# Patient Record
Sex: FEMALE | Race: WHITE | Employment: OTHER | URBAN - METROPOLITAN AREA
[De-identification: names, ages, dates, MRNs, and addresses within clinical notes are randomized per-mention and may not be internally consistent; named-entity substitution may affect disease eponyms.]

---

## 2022-10-17 ENCOUNTER — OFFICE VISIT (OUTPATIENT)
Dept: URGENT CARE | Facility: CLINIC | Age: 67
End: 2022-10-17
Payer: MEDICARE

## 2022-10-17 VITALS
HEIGHT: 60 IN | WEIGHT: 110.25 LBS | RESPIRATION RATE: 16 BRPM | BODY MASS INDEX: 21.65 KG/M2 | OXYGEN SATURATION: 99 % | TEMPERATURE: 98 F | DIASTOLIC BLOOD PRESSURE: 81 MMHG | SYSTOLIC BLOOD PRESSURE: 174 MMHG | HEART RATE: 65 BPM

## 2022-10-17 DIAGNOSIS — F17.200 SMOKER: ICD-10-CM

## 2022-10-17 DIAGNOSIS — R63.4 WEIGHT LOSS: ICD-10-CM

## 2022-10-17 DIAGNOSIS — I10 HYPERTENSION, UNSPECIFIED TYPE: ICD-10-CM

## 2022-10-17 DIAGNOSIS — R20.2 PARESTHESIA: Primary | ICD-10-CM

## 2022-10-17 PROCEDURE — 93010 ELECTROCARDIOGRAM REPORT: CPT | Mod: S$PBB,,, | Performed by: INTERNAL MEDICINE

## 2022-10-17 PROCEDURE — 99204 OFFICE O/P NEW MOD 45 MIN: CPT | Mod: S$GLB,,, | Performed by: NURSE PRACTITIONER

## 2022-10-17 PROCEDURE — 99204 PR OFFICE/OUTPT VISIT, NEW, LEVL IV, 45-59 MIN: ICD-10-PCS | Mod: S$GLB,,, | Performed by: NURSE PRACTITIONER

## 2022-10-17 PROCEDURE — 93005 EKG 12-LEAD: ICD-10-PCS | Mod: S$GLB,,, | Performed by: NURSE PRACTITIONER

## 2022-10-17 PROCEDURE — 93005 ELECTROCARDIOGRAM TRACING: CPT | Mod: S$GLB,,, | Performed by: NURSE PRACTITIONER

## 2022-10-17 PROCEDURE — 93010 EKG 12-LEAD: ICD-10-PCS | Mod: S$PBB,,, | Performed by: INTERNAL MEDICINE

## 2022-10-17 RX ORDER — IBUPROFEN 600 MG/1
600 TABLET ORAL EVERY 6 HOURS PRN
COMMUNITY
Start: 2022-08-15

## 2022-10-17 RX ORDER — BACITRACIN 500 [USP'U]/G
OINTMENT TOPICAL 2 TIMES DAILY
COMMUNITY
Start: 2022-08-15

## 2022-10-17 NOTE — PROGRESS NOTES
Subjective:       Patient ID: Dania Yin is a 67 y.o. female.    Vitals:  height is 5' (1.524 m) and weight is 50 kg (110 lb 3.7 oz). Her tympanic temperature is 97.9 °F (36.6 °C). Her blood pressure is 174/81 (abnormal) and her pulse is 65. Her respiration is 16 and oxygen saturation is 99%.     Chief Complaint: Numbness (Both shoulder to finger tips)    66 y/o female presents to  today with c/o numbness to both arms, from shoulders down to fingertips x3 weeks. Pt reports she is a smoker. Denies alcohol use. Denies back injury, muscle strain, or nerve compression. Denies h/o numbness and tingling. Denies pain to neck, jaw, or arms.  Patient reports not sleeping well, weight loss, chills, nausea, and poor appetite. Patient reports that her  passed away in January, so she has had increased stress lately.  No h/o htn that she knows of, however has not seen a primary care provider in probably the last 5 years. Denies ataxia. Pt has no facial drooping or speech abnormality. Regarding weight loss, pt states she was close to 130 pounds five to six months ago, and now 110 pounds. Denies stomach pain. Denies constipation. Has some diarrhea, which has contributed to her decrease in appetite.       Constitution: Positive for appetite change and unexpected weight change.   Gastrointestinal:  Positive for nausea and diarrhea. Negative for vomiting and constipation.   Neurological:  Positive for numbness.     Objective:      Physical Exam   Constitutional: She is oriented to person, place, and time.  Non-toxic appearance. She does not appear ill. No distress.      Comments:Pt appears stable. Good activity level. No apparent distress. Appears well hydrated. Pink in color. Well-hydrated in appearance.      HENT:   Head: Normocephalic.   Ears:   Right Ear: Tympanic membrane, external ear and ear canal normal.   Left Ear: Tympanic membrane, external ear and ear canal normal.   Nose: Nose normal.   Mouth/Throat:  Mucous membranes are moist. Oropharynx is clear.   Eyes: Right eye exhibits no discharge. Left eye exhibits no discharge.   Neck: Neck supple. Carotid bruit is not present. No neck rigidity present.   Cardiovascular: Normal rate, regular rhythm and normal heart sounds.   Pulmonary/Chest: Effort normal and breath sounds normal.   Abdominal: Normal appearance and bowel sounds are normal. She exhibits no distension. Soft. flat abdomen There is no abdominal tenderness. There is no guarding.   Musculoskeletal: Normal range of motion.         General: Normal range of motion.      Cervical back: She exhibits no tenderness.   Lymphadenopathy:     She has no cervical adenopathy.   Neurological: She is alert and oriented to person, place, and time.   Skin: Skin is warm, dry and not diaphoretic. Capillary refill takes less than 2 seconds.   Psychiatric: Her behavior is normal. Mood normal.   Nursing note and vitals reviewed.      EKG: NSR    Assessment:       1. Paresthesia    2. Hypertension, unspecified type    3. Weight loss    4. Smoker          Plan:         Paresthesia  -     IN OFFICE EKG 12-LEAD (to [place])    Hypertension, unspecified type  -     IN OFFICE EKG 12-LEAD (to [place])    Weight loss    Smoker       Patient Instructions   Daily aspirin (81mg)   Follow up with new primary care, once home, for labs  Seek ER care with chest pain, dizziness, blurry vision, unsteady walking, severe headache, or any other symptoms   Stay well hydrated  Eat a bland diet-three small meals daily   Take daily blood pressure and record numbers to present to your doctor   Due for a colonoscopy

## 2022-10-17 NOTE — PATIENT INSTRUCTIONS
Daily aspirin (81mg)   Follow up with new primary care, once home, for labs  Seek ER care with chest pain, dizziness, blurry vision, unsteady walking, severe headache, or any other symptoms   Stay well hydrated  Eat a bland diet-three small meals daily   Take daily blood pressure and record numbers to present to your doctor   Due for a colonoscopy